# Patient Record
Sex: FEMALE | Race: BLACK OR AFRICAN AMERICAN | Employment: FULL TIME | URBAN - METROPOLITAN AREA
[De-identification: names, ages, dates, MRNs, and addresses within clinical notes are randomized per-mention and may not be internally consistent; named-entity substitution may affect disease eponyms.]

---

## 2018-08-31 ENCOUNTER — HOSPITAL ENCOUNTER (OUTPATIENT)
Dept: ULTRASOUND IMAGING | Age: 58
Discharge: HOME | End: 2018-08-31
Attending: FAMILY MEDICINE
Payer: COMMERCIAL

## 2018-08-31 DIAGNOSIS — N95.0 POSTMENOPAUSAL BLEEDING: ICD-10-CM

## 2018-08-31 PROCEDURE — 76830 TRANSVAGINAL US NON-OB: CPT

## 2018-08-31 PROCEDURE — 76856 US EXAM PELVIC COMPLETE: CPT

## 2018-09-17 ENCOUNTER — OFFICE VISIT (OUTPATIENT)
Dept: SLEEP MEDICINE | Age: 58
End: 2018-09-17

## 2018-09-17 VITALS
DIASTOLIC BLOOD PRESSURE: 71 MMHG | OXYGEN SATURATION: 97 % | HEIGHT: 61 IN | HEART RATE: 78 BPM | BODY MASS INDEX: 34.74 KG/M2 | SYSTOLIC BLOOD PRESSURE: 115 MMHG | WEIGHT: 184 LBS

## 2018-09-17 DIAGNOSIS — G47.33 OBSTRUCTIVE SLEEP APNEA (ADULT) (PEDIATRIC): Primary | ICD-10-CM

## 2018-09-17 RX ORDER — ERGOCALCIFEROL 1.25 MG/1
CAPSULE ORAL
Refills: 1 | COMMUNITY
Start: 2018-08-04

## 2018-09-17 RX ORDER — ASPIRIN 81 MG/1
TABLET ORAL
Refills: 7 | COMMUNITY
Start: 2018-07-21

## 2018-09-17 RX ORDER — AMLODIPINE AND OLMESARTAN MEDOXOMIL 5; 20 MG/1; MG/1
TABLET ORAL
Refills: 1 | COMMUNITY
Start: 2018-07-24

## 2018-09-17 RX ORDER — CETIRIZINE HCL 10 MG
TABLET ORAL
Refills: 1 | COMMUNITY
Start: 2018-08-06

## 2018-09-17 RX ORDER — VENLAFAXINE 37.5 MG/1
TABLET ORAL
Refills: 1 | COMMUNITY
Start: 2018-07-16

## 2018-09-17 RX ORDER — ZOLPIDEM TARTRATE 10 MG/1
TABLET ORAL
Refills: 4 | COMMUNITY
Start: 2018-08-28

## 2018-09-17 RX ORDER — ATORVASTATIN CALCIUM 10 MG/1
TABLET, FILM COATED ORAL
Refills: 1 | COMMUNITY
Start: 2018-07-07

## 2018-09-17 RX ORDER — FLUCONAZOLE 150 MG/1
TABLET ORAL
Refills: 0 | COMMUNITY
Start: 2018-07-06

## 2018-09-17 RX ORDER — MONTELUKAST SODIUM 10 MG/1
TABLET ORAL
Refills: 1 | COMMUNITY
Start: 2018-07-24

## 2018-09-17 NOTE — PATIENT INSTRUCTIONS
[address] [facility] [place]. [place], [address]  Tel.  [phone]  Fax. 100 [facility] 60  [place], [address] [facility]  Tel.  [phone]  Fax. [phone] [address] [address] Jorg[address] Silva  Tel.  [phone]  Fax. [phone]       Insomnia: After Your Visit  Your Care Instructions  Insomnia is the inability to sleep well. It is a common problem for most people at some time. Insomnia may make it hard for you to get to sleep, stay asleep, or sleep as long as you need to. This can make you tired and grouchy during the day. It can also make you forgetful, less effective at work, and unhappy. Insomnia can be caused by conditions such as depression or anxiety. Pain can also affect your ability to sleep. When these problems are solved, the insomnia usually clears up. But sometimes bad sleep habits can cause insomnia. If insomnia is affecting your work or your enjoyment of life, you can take steps to improve your sleep. Follow-up care is a key part of your treatment and safety. Be sure to make and go to all appointments, and call your doctor if you are having problems. It's also a good idea to know your test results and keep a list of the medicines you take. How can you care for yourself at home? What to avoid  · Do not have drinks with caffeine, such as coffee or black tea, for 8 hours before bed. · Do not smoke or use other types of tobacco near bedtime. Nicotine is a stimulant and can keep you awake. · Avoid drinking alcohol late in the evening, because it can cause you to wake in the middle of the night. · Do not eat a big meal close to bedtime. If you are hungry, eat a light snack. · Do not drink a lot of water close to bedtime, because the need to urinate may wake you up during the night. · Do not read or watch TV in bed. Use the bed only for sleeping and sexual activity.   What to try  · Go to bed at the same time every night, and wake up at the same time every morning. Do not take naps during the day. · Keep your bedroom quiet, dark, and cool. · Get regular exercise, but not within 3 to 4 hours of your bedtime. .  · Sleep on a comfortable pillow and mattress. · If watching the clock makes you anxious, turn it facing away from you so you cannot see the time. · If you worry when you lie down, start a worry book. Well before bedtime, write down your worries, and then set the book and your concerns aside. · Try meditation or other relaxation techniques before you go to bed. · If you cannot fall asleep, get up and go to another room until you feel sleepy. Do something relaxing. Repeat your bedtime routine before you go to bed again. · Make your house quiet and calm about an hour before bedtime. Turn down the lights, turn off the TV, log off the computer, and turn down the volume on music. This can help you relax after a busy day. When should you call for help? Watch closely for changes in your health, and be sure to contact your doctor if:  · Your efforts to improve your sleep do not work. · Your insomnia gets worse. · You fall asleep during the day. Where can you learn more? Go to [employer]  Enter P513 in the search box to learn more about \"Insomnia: After Your Visit. \"   © 4891-2658 Healthwise, Incorporated. Care instructions adapted under license by Olivia Martinez (which disclaims liability or warranty for this information). This care instruction is for use with your licensed healthcare professional. If you have questions about a medical condition or this instruction, always ask your healthcare professional. [place] [address] any warranty or liability for your use of this information. Content Version: 1.7.12968; Last Revised: June 26, 2010    Drowsy Driving:  The [employer] cites drowsiness as a causing factor in more than 123,931 police reported crashes annually, resulting in 76,000 injuries and 1,500 deaths. Other surveys suggest 55% of people polled have driven while drowsy in the past year, 23% had fallen asleep but not crashed, 3% crashed, and 2% had and accident due to drowsy driving. Who is at risk? Young Drivers: One study of drowsy driving accidents states that 55% of the drivers were under 25 years. Of those, 75% were male. Shift Workers and Travelers: People who work overnight or travel across time zones frequently are at higher risk of experiencing Circadian Rhythm Disorders. They are trying to work and function when their body is programed to sleep. Sleep Deprived: Lack of sleep has a serious impact on your ability to pay attention or focus on a task. Consistently getting less than the average of 8 hours your body needs creates partial or cumulative sleep deprivation. Untreated Sleep Disorders: Sleep Apnea, Narcolepsy, R.L.S., and other sleep disorders (untreated) prevent a person from getting enough restful sleep. This leads to excessive daytime sleepiness and increases the risk for drowsy driving accidents by up to 7 times. Medications / Alcohol: Even over the counter medications can cause drowsiness. Medications that impair a drivers attention should have a warning label. Alcohol naturally makes you sleepy and on its own can cause accidents. Combined with excessive drowsiness its effects are amplified. Signs of Drowsy Driving:   * You don't remember driving the last few miles   * You may drift out of your primitivo   * You are unable to focus and your thoughts wander   * You may yawn more often than normal   * You have difficulty keeping your eyes open / nodding off   * Missing traffic signs, speeding, or tailgating  Prevention-   Good sleep hygiene, lifestyle and behavioral choices have the most impact on drowsy driving. There is no substitute for sleep and the average person requires 8 hours nightly.  If you find yourself driving drowsy, stop and sleep. Consider the sleep hygiene tips provided during your visit as well. Medication Refill Policy: Refills for all medications require 1 week advance notice. Please have your pharmacy fax a refill request. We are unable to fax, or call in \"controled substance\" medications and you will need to pick these prescriptions up from our office. [employer] Activation    Thank you for requesting access to [employer]. Please follow the instructions below to securely access and download your online medical record. [employer] allows you to send messages to your doctor, view your test results, renew your prescriptions, schedule appointments, and more. How Do I Sign Up? 1. In your internet browser, go to [email or web address] 2. Click on the First Time User? Click Here link in the Sign In box. You will see the New Member Sign Up page. 3. Enter your [employer] Access Code exactly as it appears below. You will not need to use this code after youve completed the sign-up process. If you do not sign up before the expiration date, you must request a new code. [employer] Access Code: [email or web address]  Expires: 2018  5:21 AM (This is the date your [employer] access code will )    4. Enter the last four digits of your Social Security Number (xxxx) and Date of Birth (mm/dd/yyyy) as indicated and click Submit. You will be taken to the next sign-up page. 5. Create a [employer] ID. This will be your [employer] login ID and cannot be changed, so think of one that is secure and easy to remember. 6. Create a [employer] password. You can change your password at any time. 7. Enter your Password Reset Question and Answer. This can be used at a later time if you forget your password. 8. Enter your e-mail address. You will receive e-mail notification when new information is available in [address] 9. Click Sign Up. You can now view and download portions of your medical record.   10. Click the [employer] link to download a portable copy of your medical information. Additional Information    If you have questions, please call [phone]. Remember, [employer] is NOT to be used for urgent needs. For medical emergencies, dial 911.

## 2018-09-17 NOTE — PROGRESS NOTES
201 [facility]., [place]. [place], [address]  Tel.  [phone]  Fax. 100 [facility] 60  [place], [address] [employer]  Tel.  [phone]  Fax. [phone] [address]  Tel.  [phone]  Fax. [phone]       Subjective:      Leilani Lebron is a 63 y.o. female who I am asked to see in consultation by Dr. Linus Sterling,  for evaluation and management of sleep apnea. She was diagnosed with sleep apnea several years ago. She is using her device regularly. She complains of poor sleep quality associated with frequent night time awakenings. Symptoms began several years ago, unchanged since that time. She usually can fall asleep in variable minutes. She moved from [place]. She is not sure if she will be living here long term. . She denies falling asleep while driving. There are minimal  mask comfort problems. The following problems are noted with PAP:     Drowsiness yes Problems exhaling no   Snoring no Forget to put on No, but she doesn't wear it when she travels   Mask Comfortable yes Can't fall asleep yes   Dry Mouth no Mask falls off no   Air Leaking no Frequent awakenings yes     Leilani Lebron does wake up frequently at night. She is bothered by waking up too early and left unable to get back to sleep. She actually sleeps about 4 hours at night and wakes up about 6 times during the night. She does work shifts:  First Shift. Kristyn Watts indicates she does get too little sleep at night. Her bedtime is 2300. She awakens at 0630. She does not take naps. . She has the following observed behaviors: Loud snoring, Light snoring, Pauses in breathing;  . Other remarks: vivid dreams    [place] Sleepiness Score: 10   which reflect mild daytime drowsiness.     Not on File      Current Outpatient Prescriptions:     amLODIPine-Olmesartan 5-20 mg tab, TK 1 T PO D PRN, Disp: , Rfl: 1    aspirin delayed-release 81 mg tablet, TK 1 T PO D, Disp: , Rfl: 7    atorvastatin (LIPITOR) 10 mg tablet, TK 1 T PO QD, Disp: , Rfl: 1    cetirizine (ZYRTEC) 10 mg tablet, TK 1 T PO QD, Disp: , Rfl: 1    ergocalciferol (ERGOCALCIFEROL) 50,000 unit capsule, TK 1 C PO Q 2 WKS, Disp: , Rfl: 1    fluconazole (DIFLUCAN) 150 mg tablet, , Disp: , Rfl: 0    montelukast (SINGULAIR) 10 mg tablet, TK 1 T PO D, Disp: , Rfl: 1    venlafaxine (EFFEXOR) 37.5 mg tablet, TK 1 T PO D, Disp: , Rfl: 1    zolpidem (AMBIEN) 10 mg tablet, TK 1 T PO QHS, Disp: , Rfl: 4     She  has a past medical history of Diabetes ([address] and Hypertension. She  has a past surgical history that includes hx gyn. She family history includes Hypertension in her mother. She  reports that she has never smoked. She has never used smokeless tobacco. She reports that she does not drink alcohol or use illicit drugs. Review of Systems:  Constitutional:  + weight gain. Eyes:  No blurred vision. CVS:  No significant chest pain  Pulm:  No significant shortness of breath  GI:  No significant nausea or vomiting, occasional heartburn  :  No significant nocturia  Musculoskeletal:  No significant joint pain at night  Skin:  No significant rashes  Neuro:  No significant dizziness   Psych: treated for anxiety  Sleep Review of Systems: notable for + difficulty falling asleep; +frequent awakenings at night;no history of any automobile or occupational accidents due to daytime drowsiness. Objective:     Visit Vitals    /71    Pulse 78    Ht 5' 1\" (1.549 m)    Wt 184 lb (83.5 kg)    SpO2 97%    BMI 34.77 kg/m2         General:   Not in acute distress   Eyes:  Anicteric sclerae, no obvious strabismus   Nose:  No obvious nasal septum deviation    Oropharynx:   Class 3 oropharyngeal outlet, thick tongue base, enlarged and boggy uvula, low-lying soft palate, narrow tonsilo-pharyngeal pilars   Tonsils:   tonsils are present and normal   Neck:   Neck circ.  in \"inches\": 15; midline trachea   Chest/Lungs: Equal lung expansion, clear on auscultation    CVS:  Normal rate, regular rhythm; no JVD   Skin:  Warm to touch; no obvious rashes   Neuro:  No focal deficits ; no obvious tremor    Psych:  Normal affect,  normal countenance;          Assessment:       ICD-10-CM ICD-9-CM    1. Obstructive sleep apnea (adult) (pediatric) G47.33 327.23        Plan:       Sleep records requested  She will bring her machine for a download next week. I will adjust settings as needed and order replacement supplies when we receive records. Counseling was provided regarding the importance of regular PAP use and on proper sleep hygiene and safe driving. 2. Insomnia - I have advised a regular sleep wake cycle. she  was advised to avoid looking at the clock during the night. Ideally, the clock face should be turned away. she was advised to minimize caffeine use and to avoid caffeine-containing beverages 8 hours prior to bedtime. Naps were discouraged. A regular exercise schedule, at least 3 hours before bedtime, would be beneficial to improving sleep quality. .  Watching TV, using laptops, tablets and smartphones in the evening was discouraged. she  was advised to keep the bedroom cool and dark. Relaxation strategies reviewed in detail. If no improvement in her sleep quality, we will consider an in lab sleep study after 3 months. .  All of her questions were addressed. Thank you for allowing us to participate in your patient's medical care.   Electronically signed by    Kati Brewster MD  Diplomate in Sleep Medicine  [facility]

## 2018-09-19 PROBLEM — I10 HYPERTENSION: Status: ACTIVE | Noted: 2018-09-19

## 2018-09-19 PROBLEM — E11.9 DIABETES (HCC): Status: ACTIVE | Noted: 2018-09-19
